# Patient Record
(demographics unavailable — no encounter records)

---

## 2025-06-26 NOTE — ASSESSMENT
[FreeTextEntry1] : Diagnosis: History of UPJO  Plan: US today with no evidence of hydronephrosis bilaterally Follow up as needed   Donnie Rondon MD, FACS, FRCS  of Urology [facility] Director of Laparoscopic and Robotic Surgery [facility] Director of Urology, [facility] Professor of Urology   (Office) [phone] (Cell)  [phone] [email or web address]     I, , personally performed the evaluation and management (E/M) services for this established patient who presents today with (a) new problem(s)/exacerbation of (an) existing condition(s). That E/M includes conducting the clinically appropriate interval history &/or exam, assessing all new/exacerbated conditions, and establishing a new plan of care. Today, my ZAID, Evelyn Grayson, was here to observe my evaluation and management service for this new problem/exacerbated condition and follow the plan of care established by me going forward.

## 2025-06-26 NOTE — PHYSICAL EXAM
[General Appearance - Well Developed] : well developed [General Appearance - Well Nourished] : well nourished [Normal Appearance] : normal appearance [Well Groomed] : well groomed [General Appearance - In No Acute Distress] : no acute distress [Abdomen Soft] : soft [Abdomen Tenderness] : non-tender [Costovertebral Angle Tenderness] : no ~M costovertebral angle tenderness [Normal Station and Gait] : the gait and station were normal for the patient's age [No Focal Deficits] : no focal deficits [Oriented To Time, Place, And Person] : oriented to person, place, and time [Affect] : the affect was normal [Mood] : the mood was normal [Not Anxious] : not anxious

## 2025-06-26 NOTE — HISTORY OF PRESENT ILLNESS
[FreeTextEntry1] :   CC: History of UPJO  S/P pyeloplasty and stricture dilation 1/2023, then cysto/stent removal 3/2023  NM renal scan 4/2023- showing equal function and no obstruction.  Doing well today No urinary complaints  US today with no hydronephrosis bilaterally  Simple bilateral parapelvic cyst  Cr from 6/2024 was 0.65 ng/mL  normal...

## 2025-06-26 NOTE — HISTORY OF PRESENT ILLNESS
[FreeTextEntry1] :   CC: History of UPJO  S/P pyeloplasty and stricture dilation 1/2023, then cysto/stent removal 3/2023  NM renal scan 4/2023- showing equal function and no obstruction.  Doing well today No urinary complaints  US today with no hydronephrosis bilaterally  Simple bilateral parapelvic cyst  Cr from 6/2024 was 0.65 ng/mL

## 2025-06-26 NOTE — ASSESSMENT
[FreeTextEntry1] : Diagnosis: History of UPJO  Plan: US today with no evidence of hydronephrosis bilaterally Follow up as needed   Donnie Rondon MD, FACS, FRCS  of Urology [facility] Director of Laparoscopic and Robotic Surgery [facility] Director of Urology, [facility] Professor of Urology   (Office) [phone] (Cell)  [phone] [email or web address]     I, , personally performed the evaluation and management (E/M) services for this established patient who presents today with (a) new problem(s)/exacerbation of (an) existing condition(s). That E/M includes conducting the clinically appropriate interval history &/or exam, assessing all new/exacerbated conditions, and establishing a new plan of care. Today, my ZAID, Evelyn Grayson, was here to observe my evaluation and management service for this new problem/exacerbated condition and follow the plan of care established by me going forward.